# Patient Record
Sex: FEMALE | Race: WHITE | NOT HISPANIC OR LATINO | Employment: FULL TIME | URBAN - METROPOLITAN AREA
[De-identification: names, ages, dates, MRNs, and addresses within clinical notes are randomized per-mention and may not be internally consistent; named-entity substitution may affect disease eponyms.]

---

## 2023-06-01 ENCOUNTER — OFFICE VISIT (OUTPATIENT)
Dept: PRIMARY CARE | Facility: CLINIC | Age: 33
End: 2023-06-01
Payer: COMMERCIAL

## 2023-06-01 VITALS
SYSTOLIC BLOOD PRESSURE: 109 MMHG | HEART RATE: 91 BPM | WEIGHT: 163 LBS | OXYGEN SATURATION: 98 % | BODY MASS INDEX: 27.83 KG/M2 | DIASTOLIC BLOOD PRESSURE: 65 MMHG | HEIGHT: 64 IN

## 2023-06-01 DIAGNOSIS — M25.512 CHRONIC LEFT SHOULDER PAIN: ICD-10-CM

## 2023-06-01 DIAGNOSIS — G89.29 CHRONIC LEFT SHOULDER PAIN: ICD-10-CM

## 2023-06-01 DIAGNOSIS — K21.9 GASTROESOPHAGEAL REFLUX DISEASE, UNSPECIFIED WHETHER ESOPHAGITIS PRESENT: ICD-10-CM

## 2023-06-01 DIAGNOSIS — Z76.89 ENCOUNTER TO ESTABLISH CARE: Primary | ICD-10-CM

## 2023-06-01 PROCEDURE — 99204 OFFICE O/P NEW MOD 45 MIN: CPT | Performed by: STUDENT IN AN ORGANIZED HEALTH CARE EDUCATION/TRAINING PROGRAM

## 2023-06-01 PROCEDURE — 1036F TOBACCO NON-USER: CPT | Performed by: STUDENT IN AN ORGANIZED HEALTH CARE EDUCATION/TRAINING PROGRAM

## 2023-06-01 RX ORDER — FAMOTIDINE 20 MG/1
TABLET, FILM COATED ORAL
COMMUNITY
End: 2024-02-06 | Stop reason: WASHOUT

## 2023-06-01 NOTE — PROGRESS NOTES
"Subjective   Patient ID: Aileen Mercado is a 32 y.o. female who presents for New Patient Visit (Stomach burning and pain. Left shoulder pain. Left leg issues.).        HPI    eST CARE   Pt's PMH, PSH, SH, FH , meds and allergies was obtained / reviewed and updated .   ePIGASTRIC / luq PAIN since age 29   Burning in nature , sharp   Was suggested  to try PPI , burning subsided with PPI use   Also has retrosternal burn   Not currently taking PPI, but taking pepcid   Intentional weight loss     Fibromyalgia     Left shoulder/ ac joint pain   Possible hyperextension injury 5 years ago     Visit Vitals  /65   Pulse 91   Ht 1.62 m (5' 3.78\")   Wt 73.9 kg (163 lb)   LMP 05/02/2023   SpO2 98%   BMI 28.17 kg/m²   Smoking Status Former   BSA 1.82 m²      Patient's last menstrual period was 05/02/2023.     Review of Systems    Constitutional : No feeling poorly / fevers/ chills / night sweats/ fatigue   Cardiovascular : No CP /Palpitations/ lower extremity edema / syncope   Respiratory : No Cough /BAHENA/Dyspnea at rest   Gastrointestinal : No abd pain / N/V  No bloody stools/ melena / constipation  Endo : No polyuria/polydipsia/ muscle weakness / sluggishness   CNS: No confusion / HA/ tingling/ numbness/ weakness of extremities  Psychiatric: No anxiety/ depression/ SI/HI    All other systems have been reviewed and are negative for complaint       Physical Exam    Constitutional : Vitals reviewed. Alert and in no distress  Cardiovascular : RRR, Normal S1, S2, No pericardial rub/ gallop, no peripheral edema   Pulmonary: No respiratory distress, CTAB   MSK : Normal gait and station , strength and tone   Skin: Normal skin color and pigmentation, normal skin turgor and no rash   Neurologic : CNs 2-12 grossly intact , no obvious FNDs  Psych : A,Ox3, normal mood and affect      Assessment/Plan   Diagnoses and all orders for this visit:  Encounter to establish care  Gastroesophageal reflux disease, unspecified whether " esophagitis present  -     EGD; Future  Chronic left shoulder pain  -     Referral to Sports Medicine; Future  -     XR acromioclavicular joints bilateral; Future        # Chronic GERD   On pepcid   EGD ordered   H/o PPI use in the past   No alarming sx     # Left shoulder/ AC / muscular pain X 5 years , possible hyperextension injury 5 years ago   Xray and sports med     RTO as needed

## 2024-02-06 ENCOUNTER — OFFICE VISIT (OUTPATIENT)
Dept: PRIMARY CARE | Facility: CLINIC | Age: 34
End: 2024-02-06
Payer: COMMERCIAL

## 2024-02-06 VITALS
HEIGHT: 65 IN | OXYGEN SATURATION: 98 % | BODY MASS INDEX: 24.32 KG/M2 | DIASTOLIC BLOOD PRESSURE: 68 MMHG | WEIGHT: 146 LBS | SYSTOLIC BLOOD PRESSURE: 100 MMHG | HEART RATE: 91 BPM

## 2024-02-06 DIAGNOSIS — M19.039 OSTEOARTHRITIS OF FOREARM, UNSPECIFIED LATERALITY: ICD-10-CM

## 2024-02-06 DIAGNOSIS — M25.512 CHRONIC LEFT SHOULDER PAIN: ICD-10-CM

## 2024-02-06 DIAGNOSIS — G89.29 CHRONIC LEFT SHOULDER PAIN: ICD-10-CM

## 2024-02-06 DIAGNOSIS — E55.9 VITAMIN D DEFICIENCY: ICD-10-CM

## 2024-02-06 DIAGNOSIS — M79.7 FIBROMYALGIA: ICD-10-CM

## 2024-02-06 DIAGNOSIS — L03.119 CELLULITIS OF LOWER EXTREMITY, UNSPECIFIED LATERALITY: ICD-10-CM

## 2024-02-06 DIAGNOSIS — Z13.220 LIPID SCREENING: ICD-10-CM

## 2024-02-06 DIAGNOSIS — F41.8 ANXIETY WITH DEPRESSION: Primary | ICD-10-CM

## 2024-02-06 PROBLEM — K21.00 GASTROESOPHAGEAL REFLUX DISEASE WITH ESOPHAGITIS WITHOUT HEMORRHAGE: Status: ACTIVE | Noted: 2024-02-06

## 2024-02-06 PROCEDURE — 1036F TOBACCO NON-USER: CPT | Performed by: NURSE PRACTITIONER

## 2024-02-06 PROCEDURE — 99204 OFFICE O/P NEW MOD 45 MIN: CPT | Performed by: NURSE PRACTITIONER

## 2024-02-06 NOTE — PROGRESS NOTES
"Subjective   Patient ID: Aileen Mercado is a 33 y.o. female who presents for New Patient Visit (Patient is here today to establish care with a new pcp.).    New pt here to establish care.   Concerns: 2 months lump/bruise on LT thigh; LT shin redness edema, shiny skin- saw derm. Conflicting  info on cause.   Lots of joint pain- chronic. Someone mentioned EDS. LT shoulder chronic pain; LT ankle pain and edema; Bilat knee pain. LT wrist chronic pain-xray reviewed.   Chronic tenderness in the muscles.   No HA. No change in vision.   Worsening dry mouth-does not drink enough. Dry skin on the shins. No vaginal dryness/pain. 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 Was anti depressants- Zoloft caused shaking; Prozac- was OK. Cymbalta worked well for her. Asking for a RX for this.   Used to see psychiatry 8 years ago  Going through a divorce at this time. Anxiety is better.   Last saw PCP last year.   Work- [email or web address]- none; rare vape.   ETOH- occasional. 1 in the past month  Exercise- walk the dog  Diet- not eating well. Trying to eat better. Recent weight loss but assumes it is r/t stress/divorce.   Torn ACL in HS with repair.          Review of Systems    Objective   /68   Pulse 91   Ht 1.638 m (5' 4.5\")   Wt 66.2 kg (146 lb)   SpO2 98%   BMI 24.67 kg/m²     Physical Exam  Vitals and nursing note reviewed.   Constitutional:       General: She is not in acute distress.     Appearance: Normal appearance. She is normal weight.   HENT:      Head: Normocephalic and atraumatic.      Right Ear: External ear normal.      Left Ear: External ear normal.      Ears:      Comments: Bilateral ear canals with impacted cerumen.     Nose: Nose normal.      Mouth/Throat:      Mouth: Mucous membranes are moist.      Pharynx: Oropharynx is clear.   Eyes:      Extraocular Movements: Extraocular movements intact.      Conjunctiva/sclera: Conjunctivae normal.      Pupils: " Pupils are equal, round, and reactive to light.   Neck:      Vascular: No carotid bruit.      Comments: Thyroid is palpable bilateral lobes equally.  Rubbery.  No nodules appreciated.  No cervical lymph nodes palpated  Cardiovascular:      Rate and Rhythm: Normal rate and regular rhythm.      Pulses: Normal pulses.      Heart sounds: Normal heart sounds.   Pulmonary:      Effort: Pulmonary effort is normal.      Breath sounds: Normal breath sounds.   Musculoskeletal:         General: No swelling or deformity. Normal range of motion.      Cervical back: Normal range of motion and neck supple.      Comments: LT lateral thigh soft bulging mild discolored area  Bilateral lower shins with thickening of the skin, purple discoloration dryness, flaking.    Lymphadenopathy:      Cervical: No cervical adenopathy.   Skin:     General: Skin is warm and dry.      Capillary Refill: Capillary refill takes less than 2 seconds.      Comments: Bilateral lower extremities mid shin to ankle with very thickened skin taut shiny purple discoloration.  Left anterior shin with some flaking dry skin.  Numbness to the touch bilaterally.  +1-2 edema nonpitting   Neurological:      Mental Status: She is alert and oriented to person, place, and time.   Psychiatric:         Mood and Affect: Mood normal.         Behavior: Behavior normal.         Thought Content: Thought content normal.         Judgment: Judgment normal.         Assessment/Plan   Diagnoses and all orders for this visit:  Anxiety with depression  Comments:  Not currently treated  Orders:  -     Vitamin B12; Future  -     Comprehensive Metabolic Panel; Future  -     Thyroxine, Free; Future  -     Thyroid Stimulating Hormone; Future  Fibromyalgia  Comments:  Not treated.  Cymbalta could be an option but I will not start meds without blood work  Orders:  -     Vitamin D 25-Hydroxy,Total (for eval of Vitamin D levels); Future  -     Comprehensive Metabolic Panel; Future  -      Thyroxine, Free; Future  -     Thyroid Stimulating Hormone; Future  Osteoarthritis of forearm, unspecified laterality  Vitamin D deficiency  Comments:  Update vitamin D level  Orders:  -     Vitamin D 25-Hydroxy,Total (for eval of Vitamin D levels); Future  Chronic left shoulder pain  Cellulitis of lower extremity, unspecified laterality  Comments:  This looks like a chronic cellulitis bilateral lower extremities unknown cause.  Will check TSH free T4  Orders:  -     Comprehensive Metabolic Panel; Future  -     CBC and Auto Differential; Future  -     Thyroxine, Free; Future  -     Thyroid Stimulating Hormone; Future  Lipid screening  Comments:  Update fasting lipid panel  Orders:  -     Lipid Panel; Future  Other orders  -     Follow Up In Primary Care - Established; Future    She has a lot of chronic pain.  It seems to be rheumatologic issue.  Will do some blood work depending on what the results are can also do an autoimmune workup.  This was discussed with patient she is agreeable.

## 2024-02-08 ENCOUNTER — LAB (OUTPATIENT)
Dept: LAB | Facility: LAB | Age: 34
End: 2024-02-08
Payer: COMMERCIAL

## 2024-02-08 DIAGNOSIS — E55.9 VITAMIN D DEFICIENCY: ICD-10-CM

## 2024-02-08 DIAGNOSIS — M79.7 FIBROMYALGIA: ICD-10-CM

## 2024-02-08 DIAGNOSIS — F41.8 ANXIETY WITH DEPRESSION: ICD-10-CM

## 2024-02-08 DIAGNOSIS — Z13.220 LIPID SCREENING: ICD-10-CM

## 2024-02-08 DIAGNOSIS — L03.119 CELLULITIS OF LOWER EXTREMITY, UNSPECIFIED LATERALITY: ICD-10-CM

## 2024-02-08 LAB
ALBUMIN SERPL BCP-MCNC: 4 G/DL (ref 3.4–5)
ALP SERPL-CCNC: 41 U/L (ref 33–110)
ALT SERPL W P-5'-P-CCNC: 6 U/L (ref 7–45)
ANION GAP SERPL CALC-SCNC: 15 MMOL/L (ref 10–20)
AST SERPL W P-5'-P-CCNC: 12 U/L (ref 9–39)
BASOPHILS # BLD AUTO: 0.08 X10*3/UL (ref 0–0.1)
BASOPHILS NFR BLD AUTO: 1.1 %
BILIRUB SERPL-MCNC: 0.4 MG/DL (ref 0–1.2)
BUN SERPL-MCNC: 11 MG/DL (ref 6–23)
CALCIUM SERPL-MCNC: 8.7 MG/DL (ref 8.6–10.3)
CHLORIDE SERPL-SCNC: 104 MMOL/L (ref 98–107)
CHOLEST SERPL-MCNC: 111 MG/DL (ref 0–199)
CHOLESTEROL/HDL RATIO: 2.3
CO2 SERPL-SCNC: 26 MMOL/L (ref 21–32)
CREAT SERPL-MCNC: 0.65 MG/DL (ref 0.5–1.05)
EGFRCR SERPLBLD CKD-EPI 2021: >90 ML/MIN/1.73M*2
EOSINOPHIL # BLD AUTO: 0.2 X10*3/UL (ref 0–0.7)
EOSINOPHIL NFR BLD AUTO: 2.7 %
ERYTHROCYTE [DISTWIDTH] IN BLOOD BY AUTOMATED COUNT: 13.5 % (ref 11.5–14.5)
GLUCOSE SERPL-MCNC: 77 MG/DL (ref 74–99)
HCT VFR BLD AUTO: 38.2 % (ref 36–46)
HDLC SERPL-MCNC: 49 MG/DL
HGB BLD-MCNC: 12.4 G/DL (ref 12–16)
IMM GRANULOCYTES # BLD AUTO: 0.01 X10*3/UL (ref 0–0.7)
IMM GRANULOCYTES NFR BLD AUTO: 0.1 % (ref 0–0.9)
LDLC SERPL CALC-MCNC: 52 MG/DL
LYMPHOCYTES # BLD AUTO: 2.15 X10*3/UL (ref 1.2–4.8)
LYMPHOCYTES NFR BLD AUTO: 28.5 %
MCH RBC QN AUTO: 30.7 PG (ref 26–34)
MCHC RBC AUTO-ENTMCNC: 32.5 G/DL (ref 32–36)
MCV RBC AUTO: 95 FL (ref 80–100)
MONOCYTES # BLD AUTO: 0.47 X10*3/UL (ref 0.1–1)
MONOCYTES NFR BLD AUTO: 6.2 %
NEUTROPHILS # BLD AUTO: 4.63 X10*3/UL (ref 1.2–7.7)
NEUTROPHILS NFR BLD AUTO: 61.4 %
NON HDL CHOLESTEROL: 62 MG/DL (ref 0–149)
NRBC BLD-RTO: 0 /100 WBCS (ref 0–0)
PLATELET # BLD AUTO: 269 X10*3/UL (ref 150–450)
POTASSIUM SERPL-SCNC: 4.5 MMOL/L (ref 3.5–5.3)
PROT SERPL-MCNC: 7.1 G/DL (ref 6.4–8.2)
RBC # BLD AUTO: 4.04 X10*6/UL (ref 4–5.2)
SODIUM SERPL-SCNC: 140 MMOL/L (ref 136–145)
T4 FREE SERPL-MCNC: 0.59 NG/DL (ref 0.61–1.12)
TRIGL SERPL-MCNC: 49 MG/DL (ref 0–149)
TSH SERPL-ACNC: 1.71 MIU/L (ref 0.44–3.98)
VLDL: 10 MG/DL (ref 0–40)
WBC # BLD AUTO: 7.5 X10*3/UL (ref 4.4–11.3)

## 2024-02-08 PROCEDURE — 82306 VITAMIN D 25 HYDROXY: CPT

## 2024-02-08 PROCEDURE — 85025 COMPLETE CBC W/AUTO DIFF WBC: CPT

## 2024-02-08 PROCEDURE — 80053 COMPREHEN METABOLIC PANEL: CPT

## 2024-02-08 PROCEDURE — 84439 ASSAY OF FREE THYROXINE: CPT

## 2024-02-08 PROCEDURE — 84443 ASSAY THYROID STIM HORMONE: CPT

## 2024-02-08 PROCEDURE — 80061 LIPID PANEL: CPT

## 2024-02-08 PROCEDURE — 36415 COLL VENOUS BLD VENIPUNCTURE: CPT

## 2024-02-08 PROCEDURE — 82607 VITAMIN B-12: CPT

## 2024-02-09 LAB
25(OH)D3 SERPL-MCNC: 15 NG/ML (ref 30–100)
VIT B12 SERPL-MCNC: 230 PG/ML (ref 211–911)

## 2024-02-15 ENCOUNTER — PATIENT MESSAGE (OUTPATIENT)
Dept: PRIMARY CARE | Facility: CLINIC | Age: 34
End: 2024-02-15

## 2024-02-15 ENCOUNTER — OFFICE VISIT (OUTPATIENT)
Dept: PRIMARY CARE | Facility: CLINIC | Age: 34
End: 2024-02-15
Payer: COMMERCIAL

## 2024-02-15 VITALS
HEART RATE: 91 BPM | WEIGHT: 147 LBS | BODY MASS INDEX: 24.49 KG/M2 | OXYGEN SATURATION: 99 % | DIASTOLIC BLOOD PRESSURE: 79 MMHG | HEIGHT: 65 IN | SYSTOLIC BLOOD PRESSURE: 127 MMHG

## 2024-02-15 DIAGNOSIS — F41.8 ANXIETY WITH DEPRESSION: Primary | ICD-10-CM

## 2024-02-15 DIAGNOSIS — M25.532 LEFT WRIST PAIN: ICD-10-CM

## 2024-02-15 DIAGNOSIS — E53.8 VITAMIN B 12 DEFICIENCY: ICD-10-CM

## 2024-02-15 DIAGNOSIS — E55.9 VITAMIN D DEFICIENCY: ICD-10-CM

## 2024-02-15 PROCEDURE — 99214 OFFICE O/P EST MOD 30 MIN: CPT | Performed by: NURSE PRACTITIONER

## 2024-02-15 PROCEDURE — 1036F TOBACCO NON-USER: CPT | Performed by: NURSE PRACTITIONER

## 2024-02-15 NOTE — PATIENT INSTRUCTIONS
Vitamin D def- add Vitamin d 3 5,000 units a day with food  B12 def- add 1000mcg a day sublingual every am.   Vicks on the toenails.   Repeat the TSH, FT4, FT3 in 6 months  Pepcid 20 mg for reflux  Omeprazole 20 mg pm for reflux

## 2024-02-15 NOTE — PROGRESS NOTES
"Subjective   Patient ID: Aileen Mercado is a 33 y.o. female who presents for Follow-up and Results (Patient is here today to review her lab results. ).    33 year old female here to review recent lab results. LP, CBC, CMP looks good. TSH looks good, FT4 low. No known Thyroid disease.   Vitamin and B 12 deficiency   A few more concerns- toenail discoloration  Chronic left wrist pain- asking for ortho referral.   \"Bumps under the skin\".   Chronic stomach indigestion. Was supposed to have an EGD but could not afford it.          Review of Systems    Objective   /79   Pulse 91   Ht 1.638 m (5' 4.5\")   Wt 66.7 kg (147 lb)   SpO2 99%   BMI 24.84 kg/m²     Physical Exam  Constitutional:       Appearance: Normal appearance.   Cardiovascular:      Rate and Rhythm: Normal rate and regular rhythm.   Pulmonary:      Effort: Pulmonary effort is normal.      Breath sounds: Normal breath sounds.   Skin:     Comments: All toenails are purple in color. WTT. No injuries. Surrounding nail beds and skin pink   Neurological:      Mental Status: She is alert and oriented to person, place, and time.   Psychiatric:         Mood and Affect: Mood normal.         Assessment/Plan   Diagnoses and all orders for this visit:  Left wrist pain  Comments:  Ortho referral  Orders:  -     Referral to Orthopaedic Surgery; Future  Vitamin D deficiency  Comments:  Add vitamin D3 5000 units daily with food  Vitamin B 12 deficiency  Comments:  Add vitamin B12 daily sublingual in the a.m.  Other orders  -     Follow Up In Primary Care - Established  -     Follow Up In Primary Care - Established; Future    I cannot explain why her toenails are discolored.  She can try over-the-counter Vicks twice a day.  She can see dermatology.  There is no other symptoms accompanying the discoloration of the nails.  At this time will monitor     "

## 2024-02-20 RX ORDER — DULOXETIN HYDROCHLORIDE 30 MG/1
30 CAPSULE, DELAYED RELEASE ORAL DAILY
Qty: 30 CAPSULE | Refills: 5 | Status: SHIPPED | OUTPATIENT
Start: 2024-02-20 | End: 2024-08-18

## 2024-03-21 ENCOUNTER — APPOINTMENT (OUTPATIENT)
Dept: OBSTETRICS AND GYNECOLOGY | Facility: CLINIC | Age: 34
End: 2024-03-21
Payer: COMMERCIAL

## 2024-03-21 ENCOUNTER — APPOINTMENT (OUTPATIENT)
Dept: ORTHOPEDIC SURGERY | Facility: CLINIC | Age: 34
End: 2024-03-21
Payer: COMMERCIAL

## 2024-04-11 ENCOUNTER — APPOINTMENT (OUTPATIENT)
Dept: ORTHOPEDIC SURGERY | Facility: CLINIC | Age: 34
End: 2024-04-11
Payer: COMMERCIAL

## 2024-04-11 ENCOUNTER — APPOINTMENT (OUTPATIENT)
Dept: OBSTETRICS AND GYNECOLOGY | Facility: CLINIC | Age: 34
End: 2024-04-11
Payer: COMMERCIAL

## 2024-05-02 ENCOUNTER — APPOINTMENT (OUTPATIENT)
Dept: ORTHOPEDIC SURGERY | Facility: CLINIC | Age: 34
End: 2024-05-02
Payer: COMMERCIAL

## 2024-05-02 ENCOUNTER — APPOINTMENT (OUTPATIENT)
Dept: OBSTETRICS AND GYNECOLOGY | Facility: CLINIC | Age: 34
End: 2024-05-02
Payer: COMMERCIAL

## 2024-05-09 ENCOUNTER — APPOINTMENT (OUTPATIENT)
Dept: ORTHOPEDIC SURGERY | Facility: CLINIC | Age: 34
End: 2024-05-09
Payer: COMMERCIAL

## 2024-06-13 ENCOUNTER — OFFICE VISIT (OUTPATIENT)
Dept: ORTHOPEDIC SURGERY | Facility: CLINIC | Age: 34
End: 2024-06-13
Payer: COMMERCIAL

## 2024-06-13 ENCOUNTER — HOSPITAL ENCOUNTER (OUTPATIENT)
Dept: RADIOLOGY | Facility: CLINIC | Age: 34
Discharge: HOME | End: 2024-06-13
Payer: COMMERCIAL

## 2024-06-13 DIAGNOSIS — M25.532 LEFT WRIST PAIN: ICD-10-CM

## 2024-06-13 DIAGNOSIS — M19.032 ARTHRITIS OF LEFT WRIST: ICD-10-CM

## 2024-06-13 DIAGNOSIS — M25.532 LEFT WRIST PAIN: Primary | ICD-10-CM

## 2024-06-13 PROCEDURE — 73110 X-RAY EXAM OF WRIST: CPT | Mod: LT

## 2024-06-13 PROCEDURE — 1036F TOBACCO NON-USER: CPT | Performed by: ORTHOPAEDIC SURGERY

## 2024-06-13 PROCEDURE — 99214 OFFICE O/P EST MOD 30 MIN: CPT | Performed by: ORTHOPAEDIC SURGERY

## 2024-06-13 PROCEDURE — 73110 X-RAY EXAM OF WRIST: CPT | Mod: LEFT SIDE | Performed by: STUDENT IN AN ORGANIZED HEALTH CARE EDUCATION/TRAINING PROGRAM

## 2024-06-13 PROCEDURE — 99204 OFFICE O/P NEW MOD 45 MIN: CPT | Performed by: ORTHOPAEDIC SURGERY

## 2024-06-13 ASSESSMENT — PAIN - FUNCTIONAL ASSESSMENT: PAIN_FUNCTIONAL_ASSESSMENT: 0-10

## 2024-06-13 ASSESSMENT — ENCOUNTER SYMPTOMS
LOSS OF SENSATION IN FEET: 0
DEPRESSION: 0
OCCASIONAL FEELINGS OF UNSTEADINESS: 0

## 2024-06-13 ASSESSMENT — PATIENT HEALTH QUESTIONNAIRE - PHQ9
1. LITTLE INTEREST OR PLEASURE IN DOING THINGS: NOT AT ALL
2. FEELING DOWN, DEPRESSED OR HOPELESS: NOT AT ALL
SUM OF ALL RESPONSES TO PHQ9 QUESTIONS 1 AND 2: 0

## 2024-06-13 ASSESSMENT — PAIN SCALES - GENERAL: PAINLEVEL_OUTOF10: 6

## 2024-06-15 PROCEDURE — 20605 DRAIN/INJ JOINT/BURSA W/O US: CPT | Performed by: ORTHOPAEDIC SURGERY

## 2024-06-15 RX ORDER — LIDOCAINE HYDROCHLORIDE 10 MG/ML
1 INJECTION INFILTRATION; PERINEURAL
Status: COMPLETED | OUTPATIENT
Start: 2024-06-15 | End: 2024-06-15

## 2024-06-15 RX ORDER — TRIAMCINOLONE ACETONIDE 40 MG/ML
40 INJECTION, SUSPENSION INTRA-ARTICULAR; INTRAMUSCULAR
Status: COMPLETED | OUTPATIENT
Start: 2024-06-15 | End: 2024-06-15

## 2024-06-15 NOTE — PROGRESS NOTES
History of Present Illness:  Chief Complaint   Patient presents with    Left Wrist - Pain       33-year-old female presents for evaluation of chronic left wrist pain.  She has had pain into her left wrist for years.  This is worse with increased activities including lifting/loading her wrist.  This is particularly true when wrist is in extremes of flexion/extension.  She has noticed some decreased motion in her wrist as well.  She does have history of inflammatory arthritis, but not currently under the care of of a rheumatologist.  Previous issue with sulfadiazine that was used to treat her inflammatory arthritis.  She has had progressive weakness secondary to her pain.    Past Medical History:   Diagnosis Date    Anemia     Arthritis     Fibromyalgia     MGUS (monoclonal gammopathy of unknown significance)     Sepsis (Multi)        Medication Documentation Review Audit       Reviewed by Nena Cohen CMA (Medical Assistant) on 24 at 1037      Medication Order Taking? Sig Documenting Provider Last Dose Status   DULoxetine (Cymbalta) 30 mg DR capsule [id number]  Take 1 capsule (30 mg) by mouth once daily. Do not crush or chew. Jillian M Butt, APRN-CNP  Active                    Allergies   Allergen Reactions    Sulfadiazine Anaphylaxis    Cephalosporins Unknown    Penicillins Hives and Rash       Social History     Socioeconomic History    Marital status: Single     Spouse name: Not on file    Number of children: Not on file    Years of education: Not on file    Highest education level: Not on file   Occupational History    Not on file   Tobacco Use    Smoking status: Former     Current packs/day: 0.00     Types: Cigarettes     Quit date:      Years since quittin.4    Smokeless tobacco: Never   Substance and Sexual Activity    Alcohol use: Not Currently    Drug use: Not Currently    Sexual activity: Not on file   Other Topics Concern    Not on file   Social History Narrative    Not on file     Social  Determinants of Health     Financial Resource Strain: Low Risk  (7/12/2020)    Received from [facility]    Overall Financial Resource Strain (CARDIA)     Difficulty of Paying Living Expenses: Not very hard   Food Insecurity: No Food Insecurity (7/12/2020)    Received from [facility]    Hunger Vital Sign     Worried About Running Out of Food in the Last Year: Never true     Ran Out of Food in the Last Year: Never true   Transportation Needs: No Transportation Needs (7/12/2020)    Received from [facility]    PRAPARE - Transportation     Lack of Transportation (Medical): No     Lack of Transportation (Non-Medical): No   Physical Activity: Sufficiently Active (7/12/2020)    Received from [facility]    Exercise Vital Sign     Days of Exercise per Week: 3 days     Minutes of Exercise per Session: 60 min   Stress: Stress Concern Present (7/12/2020)    Received from [facility]    Citizen of the [place] [employer] - Occupational Stress Questionnaire     Feeling of Stress : Rather much   Social Connections: Socially Isolated (7/12/2020)    Received from [facility]    Social Connection and Isolation Panel [NHANES]     Frequency of Communication with Friends and Family: Once a week     Frequency of Social Gatherings with Friends and Family: Once a week     Attends Jainism Services: Never     Active Member of Clubs or Organizations: No     Attends Club or Organization Meetings: Never     Marital Status:    Intimate Partner Violence: Not on file   Housing Stability: Unknown (7/12/2020)    Received from [facility]    Housing Stability Vital Sign     Unable to Pay for Housing in the Last Year: No     Number of Places Lived in the Last Year: Not on file     Unstable Housing in the Last Year: No       Past Surgical History:   Procedure Laterality Date    ANTERIOR CRUCIATE LIGAMENT REPAIR          Review of Systems   GENERAL: Negative for malaise, significant weight loss,  fever  MUSCULOSKELETAL: see HPI  NEURO:  Negative     Physical Examination  Constitutional: Appears well-developed and well-nourished.  Head: Normocephalic and atraumatic.  Eyes: EOMI grossly  Cardiovascular: Intact distal pulses.   Respiratory: Effort normal. No respiratory distress.  Neurologic: Alert and oriented to person, place, and time.  Skin: Skin is warm and dry.  Hematologic / Lymphatic: No lymphedema, lymphangitis.  Psychiatric: normal mood and affect. Behavior is normal.   Musculoskeletal:  Left wrist: Skin intact.  No notable edema.  70 degrees extension and 45 degrees flexion.  Positive EDUARDO.  No tenderness overlying distal pole of scaphoid/anatomic snuffbox.  Negative DRUJ/TFCC.  80/80 degrees pronation/supination.  Sensation grossly intact distally.  2+ radial pulse.    Radiographs: Left wrist radiographs ordered and available for my review/interpretation demonstrate degenerative changes in the radiocarpal and midcarpal joints.  Lunate sitting in significant flexion.  Findings consistent with inflammatory arthritis.  No fracture/dislocation.     Assessment:  Patient with chronic left wrist pain that is increasing in severity.  Radiographs consistent with inflammatory arthritis.     Plan:  We discussed likely diagnosis as well as potential treatment options.  We discussed role of bracing, activity modifications, anti-inflammatories, potential corticosteroid injections as well as medical management under the care of of rheumatologist.  Given her current level of symptoms she would like to proceed with corticosteroid injection.  I have also placed a referral for rheumatology as this may be of the most benefit long-term.    Patient ID: Aileen Mercado is a 33 y.o. female.    M Inj/Asp: L radiocarpal on 6/15/2024 5:25 PM  Indications: pain  Details: 25 G needle, dorsal approach  Medications: 40 mg triamcinolone acetonide 40 mg/mL; 1 mL lidocaine 10 mg/mL (1 %)  Outcome: tolerated well, no immediate  complications  Procedure, treatment alternatives, risks and benefits explained, specific risks discussed. Consent was given by the patient. Immediately prior to procedure a time out was called to verify the correct patient, procedure, equipment, support staff and site/side marked as required. Patient was prepped and draped in the usual sterile fashion.

## 2024-06-26 ENCOUNTER — APPOINTMENT (OUTPATIENT)
Dept: OBSTETRICS AND GYNECOLOGY | Facility: CLINIC | Age: 34
End: 2024-06-26
Payer: COMMERCIAL

## 2024-06-26 VITALS
WEIGHT: 147 LBS | HEIGHT: 65 IN | SYSTOLIC BLOOD PRESSURE: 122 MMHG | BODY MASS INDEX: 24.49 KG/M2 | DIASTOLIC BLOOD PRESSURE: 76 MMHG

## 2024-06-26 DIAGNOSIS — Z12.4 PAP SMEAR FOR CERVICAL CANCER SCREENING: ICD-10-CM

## 2024-06-26 DIAGNOSIS — Z11.3 SCREENING EXAMINATION FOR STI: ICD-10-CM

## 2024-06-26 DIAGNOSIS — Z01.419 WELL WOMAN EXAM: Primary | ICD-10-CM

## 2024-06-26 PROCEDURE — 87591 N.GONORRHOEAE DNA AMP PROB: CPT

## 2024-06-26 PROCEDURE — 99385 PREV VISIT NEW AGE 18-39: CPT | Performed by: MIDWIFE

## 2024-06-26 PROCEDURE — 87491 CHLMYD TRACH DNA AMP PROBE: CPT

## 2024-06-26 PROCEDURE — 87624 HPV HI-RISK TYP POOLED RSLT: CPT

## 2024-06-26 PROCEDURE — 1036F TOBACCO NON-USER: CPT | Performed by: MIDWIFE

## 2024-06-26 PROCEDURE — 88142 CYTOPATH C/V THIN LAYER: CPT

## 2024-06-26 NOTE — PROGRESS NOTES
Subjective   Patient ID: Aileen Mercado is a 33 y.o. female who presents for annual.  HPI  PMHx: 2017 ASCUS +HPV pap; pap 2019 NIL  SocHx:  in 4/2024, no current partner  ROS: no pelvic pain, no urinary c/o, NAD  Review of Systems    Objective   Physical Exam  Vitals and nursing note reviewed.   Constitutional:       Appearance: Normal appearance.   HENT:      Nose: Nose normal.   Eyes:      Pupils: Pupils are equal, round, and reactive to light.   Neck:      Thyroid: No thyroid mass.   Cardiovascular:      Rate and Rhythm: Normal rate and regular rhythm.   Pulmonary:      Effort: Pulmonary effort is normal.      Breath sounds: Normal breath sounds.   Chest:      Chest wall: No mass.   Breasts:     Right: Normal.      Left: Normal.   Abdominal:      Palpations: Abdomen is soft. There is no mass.      Tenderness: There is no abdominal tenderness.   Genitourinary:     General: Normal vulva.      Exam position: Lithotomy position.      Labia:         Right: No rash, tenderness or lesion.         Left: No rash, tenderness or lesion.       Vagina: Bleeding present.      Cervix: Normal.      Uterus: Normal.       Adnexa: Right adnexa normal and left adnexa normal.      Comments: Pap obtained  On menses  Musculoskeletal:         General: Normal range of motion.   Lymphadenopathy:      Cervical: No cervical adenopathy.      Lower Body: No right inguinal adenopathy. No left inguinal adenopathy.   Skin:     General: Skin is warm and dry.   Neurological:      Mental Status: She is alert and oriented to person, place, and time.      Motor: Motor function is intact.      Gait: Gait is intact.   Psychiatric:         Mood and Affect: Mood normal.         Assessment/Plan   Diagnoses and all orders for this visit:  Well woman exam  -     C. Trachomatis / N. Gonorrhoeae, Amplified Detection  -     THINPREP PAP TEST  Screening examination for STI  -     C. Trachomatis / N. Gonorrhoeae, Amplified Detection  Pap smear for  cervical cancer screening  -     THINPREP PAP TEST    Reassurance given re exam findings  Pt declined BCM at this time  RTO AE/PRN       SUSY Agudelo-KELLEN, ND 06/26/24 11:02 AM

## 2024-06-27 LAB
C TRACH RRNA SPEC QL NAA+PROBE: NEGATIVE
N GONORRHOEA DNA SPEC QL PROBE+SIG AMP: NEGATIVE

## 2024-07-03 ENCOUNTER — HOSPITAL ENCOUNTER (OUTPATIENT)
Dept: RADIOLOGY | Facility: CLINIC | Age: 34
Discharge: HOME | End: 2024-07-03
Payer: COMMERCIAL

## 2024-07-03 ENCOUNTER — APPOINTMENT (OUTPATIENT)
Dept: PRIMARY CARE | Facility: CLINIC | Age: 34
End: 2024-07-03
Payer: COMMERCIAL

## 2024-07-03 VITALS
OXYGEN SATURATION: 96 % | SYSTOLIC BLOOD PRESSURE: 116 MMHG | HEIGHT: 65 IN | BODY MASS INDEX: 24.49 KG/M2 | HEART RATE: 97 BPM | WEIGHT: 147 LBS | DIASTOLIC BLOOD PRESSURE: 80 MMHG

## 2024-07-03 DIAGNOSIS — R22.31 MASS OF RIGHT FOREARM: ICD-10-CM

## 2024-07-03 DIAGNOSIS — R22.31 MASS OF RIGHT FOREARM: Primary | ICD-10-CM

## 2024-07-03 PROBLEM — F33.8 SEASONAL DEPRESSION (CMS-HCC): Status: ACTIVE | Noted: 2024-07-03

## 2024-07-03 PROBLEM — D47.2 MONOCLONAL GAMMOPATHY OF UNKNOWN SIGNIFICANCE (MGUS): Status: ACTIVE | Noted: 2024-07-03

## 2024-07-03 PROCEDURE — 1036F TOBACCO NON-USER: CPT | Performed by: NURSE PRACTITIONER

## 2024-07-03 PROCEDURE — 73090 X-RAY EXAM OF FOREARM: CPT | Mod: RIGHT SIDE | Performed by: RADIOLOGY

## 2024-07-03 PROCEDURE — 73090 X-RAY EXAM OF FOREARM: CPT | Mod: RT

## 2024-07-03 PROCEDURE — 99213 OFFICE O/P EST LOW 20 MIN: CPT | Performed by: NURSE PRACTITIONER

## 2024-07-03 RX ORDER — VIT C/E/ZN/COPPR/LUTEIN/ZEAXAN 250MG-90MG
1000 CAPSULE ORAL
COMMUNITY

## 2024-07-03 RX ORDER — LANOLIN ALCOHOL/MO/W.PET/CERES
1000 CREAM (GRAM) TOPICAL
COMMUNITY

## 2024-07-03 NOTE — PROGRESS NOTES
"Subjective   Patient ID: Aileen Mercado is a 34 y.o. female who presents for Cyst (Patient mentioned for the last 3 months she noticed a lump on her right forearm. Patient states she did not do anything to cause it and it is not painful. Patient noticed it is getting bigger in size. ).    34 year old female here for \"an acute issue\".   a couple months ago she noticed a mass on the posterior RT forearm. Non tender, thinks it is getting larger. She also has a very small one on her LT calf that is firm. Another one on the LT shin bone  CBC from early this year reviewed.          Review of Systems    Objective   /80   Pulse 97   Ht 1.638 m (5' 4.5\")   Wt 66.7 kg (147 lb)   LMP 06/21/2024   SpO2 96%   BMI 24.84 kg/m²     Physical Exam  Constitutional:       Appearance: Normal appearance. She is obese.   Musculoskeletal:        Arms:       Comments: Golf ball sized nodular mass deep in the posterior FA. It feels like it is right next to or on the ulna. Non tender to palpation. It is mobile, round, soft   Neurological:      Mental Status: She is alert.         Assessment/Plan   Diagnoses and all orders for this visit:  Mass of right forearm  Comments:  poss cyst. get Xray of ulna  Orders:  -     XR forearm right 2 views; Future         "

## 2024-07-08 LAB
CYTOLOGY CMNT CVX/VAG CYTO-IMP: NORMAL
HPV HR 12 DNA GENITAL QL NAA+PROBE: NEGATIVE
HPV HR GENOTYPES PNL CVX NAA+PROBE: NEGATIVE
HPV16 DNA SPEC QL NAA+PROBE: NEGATIVE
HPV18 DNA SPEC QL NAA+PROBE: NEGATIVE
LAB AP HPV GENOTYPE QUESTION: YES
LAB AP HPV HR: NORMAL
LABORATORY COMMENT REPORT: NORMAL
LABORATORY COMMENT REPORT: NORMAL
LMP START DATE: NORMAL
PATH REPORT.TOTAL CANCER: NORMAL
RESIDENT REVIEW: NORMAL

## 2024-07-10 ENCOUNTER — OFFICE VISIT (OUTPATIENT)
Dept: ORTHOPEDIC SURGERY | Facility: CLINIC | Age: 34
End: 2024-07-10
Payer: COMMERCIAL

## 2024-07-10 ENCOUNTER — LAB (OUTPATIENT)
Dept: LAB | Facility: LAB | Age: 34
End: 2024-07-10
Payer: COMMERCIAL

## 2024-07-10 ENCOUNTER — HOSPITAL ENCOUNTER (OUTPATIENT)
Dept: RADIOLOGY | Facility: CLINIC | Age: 34
Discharge: HOME | End: 2024-07-10
Payer: COMMERCIAL

## 2024-07-10 VITALS — BODY MASS INDEX: 25.1 KG/M2 | HEIGHT: 64 IN | WEIGHT: 147 LBS

## 2024-07-10 DIAGNOSIS — R22.41 LEG MASS, RIGHT: ICD-10-CM

## 2024-07-10 DIAGNOSIS — R22.41 LEG MASS, RIGHT: Primary | ICD-10-CM

## 2024-07-10 LAB
ALBUMIN SERPL BCP-MCNC: 4 G/DL (ref 3.4–5)
ALP SERPL-CCNC: 48 U/L (ref 33–110)
ALT SERPL W P-5'-P-CCNC: 6 U/L (ref 7–45)
ANION GAP SERPL CALC-SCNC: 11 MMOL/L (ref 10–20)
AST SERPL W P-5'-P-CCNC: 14 U/L (ref 9–39)
BASOPHILS # BLD AUTO: 0.08 X10*3/UL (ref 0–0.1)
BASOPHILS NFR BLD AUTO: 1 %
BILIRUB SERPL-MCNC: 0.2 MG/DL (ref 0–1.2)
BUN SERPL-MCNC: 10 MG/DL (ref 6–23)
CALCIUM SERPL-MCNC: 8.6 MG/DL (ref 8.6–10.3)
CHLORIDE SERPL-SCNC: 103 MMOL/L (ref 98–107)
CO2 SERPL-SCNC: 29 MMOL/L (ref 21–32)
CREAT SERPL-MCNC: 0.62 MG/DL (ref 0.5–1.05)
CRP SERPL-MCNC: 0.96 MG/DL
EGFRCR SERPLBLD CKD-EPI 2021: >90 ML/MIN/1.73M*2
EOSINOPHIL # BLD AUTO: 0.17 X10*3/UL (ref 0–0.7)
EOSINOPHIL NFR BLD AUTO: 2.2 %
ERYTHROCYTE [DISTWIDTH] IN BLOOD BY AUTOMATED COUNT: 13 % (ref 11.5–14.5)
ERYTHROCYTE [SEDIMENTATION RATE] IN BLOOD BY WESTERGREN METHOD: 24 MM/H (ref 0–20)
GLUCOSE SERPL-MCNC: 90 MG/DL (ref 74–99)
HCT VFR BLD AUTO: 35.3 % (ref 36–46)
HGB BLD-MCNC: 11.8 G/DL (ref 12–16)
IMM GRANULOCYTES # BLD AUTO: 0.02 X10*3/UL (ref 0–0.7)
IMM GRANULOCYTES NFR BLD AUTO: 0.3 % (ref 0–0.9)
LYMPHOCYTES # BLD AUTO: 2.11 X10*3/UL (ref 1.2–4.8)
LYMPHOCYTES NFR BLD AUTO: 27.6 %
MCH RBC QN AUTO: 31.1 PG (ref 26–34)
MCHC RBC AUTO-ENTMCNC: 33.4 G/DL (ref 32–36)
MCV RBC AUTO: 93 FL (ref 80–100)
MONOCYTES # BLD AUTO: 0.45 X10*3/UL (ref 0.1–1)
MONOCYTES NFR BLD AUTO: 5.9 %
NEUTROPHILS # BLD AUTO: 4.82 X10*3/UL (ref 1.2–7.7)
NEUTROPHILS NFR BLD AUTO: 63 %
NRBC BLD-RTO: 0 /100 WBCS (ref 0–0)
PLATELET # BLD AUTO: 361 X10*3/UL (ref 150–450)
POTASSIUM SERPL-SCNC: 4.7 MMOL/L (ref 3.5–5.3)
PROT SERPL-MCNC: 7.3 G/DL (ref 6.4–8.2)
RBC # BLD AUTO: 3.79 X10*6/UL (ref 4–5.2)
RHEUMATOID FACT SER NEPH-ACNC: <10 IU/ML (ref 0–15)
SODIUM SERPL-SCNC: 138 MMOL/L (ref 136–145)
WBC # BLD AUTO: 7.7 X10*3/UL (ref 4.4–11.3)

## 2024-07-10 PROCEDURE — 85025 COMPLETE CBC W/AUTO DIFF WBC: CPT

## 2024-07-10 PROCEDURE — 99214 OFFICE O/P EST MOD 30 MIN: CPT | Performed by: STUDENT IN AN ORGANIZED HEALTH CARE EDUCATION/TRAINING PROGRAM

## 2024-07-10 PROCEDURE — 80053 COMPREHEN METABOLIC PANEL: CPT

## 2024-07-10 PROCEDURE — 85652 RBC SED RATE AUTOMATED: CPT

## 2024-07-10 PROCEDURE — 85613 RUSSELL VIPER VENOM DILUTED: CPT

## 2024-07-10 PROCEDURE — 81381 HLA I TYPING 1 ALLELE HR: CPT

## 2024-07-10 PROCEDURE — 73590 X-RAY EXAM OF LOWER LEG: CPT | Mod: RT

## 2024-07-10 PROCEDURE — 36415 COLL VENOUS BLD VENIPUNCTURE: CPT

## 2024-07-10 PROCEDURE — 1036F TOBACCO NON-USER: CPT | Performed by: STUDENT IN AN ORGANIZED HEALTH CARE EDUCATION/TRAINING PROGRAM

## 2024-07-10 PROCEDURE — 86431 RHEUMATOID FACTOR QUANT: CPT

## 2024-07-10 PROCEDURE — 99204 OFFICE O/P NEW MOD 45 MIN: CPT | Performed by: STUDENT IN AN ORGANIZED HEALTH CARE EDUCATION/TRAINING PROGRAM

## 2024-07-10 PROCEDURE — 86140 C-REACTIVE PROTEIN: CPT

## 2024-07-10 PROCEDURE — 85730 THROMBOPLASTIN TIME PARTIAL: CPT

## 2024-07-10 PROCEDURE — 73590 X-RAY EXAM OF LOWER LEG: CPT | Mod: RIGHT SIDE | Performed by: RADIOLOGY

## 2024-07-10 PROCEDURE — 86200 CCP ANTIBODY: CPT

## 2024-07-10 ASSESSMENT — PAIN SCALES - GENERAL: PAINLEVEL_OUTOF10: 5 - MODERATE PAIN

## 2024-07-10 ASSESSMENT — ENCOUNTER SYMPTOMS
LOSS OF SENSATION IN FEET: 0
OCCASIONAL FEELINGS OF UNSTEADINESS: 0
DEPRESSION: 0

## 2024-07-10 ASSESSMENT — PAIN - FUNCTIONAL ASSESSMENT: PAIN_FUNCTIONAL_ASSESSMENT: 0-10

## 2024-07-10 NOTE — PROGRESS NOTES
Orthopaedic Surgery  New Patient Clinic Note    Aileen Mercado [id number] July 10, 2024    Reason for Consult: Right forearm as well as right leg mass    HPI: 34-year-old female with multiple previous inflammatory medical comorbidities.  She states she was diagnosed with inflammatory arthropathy once when she was younger but had complications with the medication and therefore ever went back to see rheumatology.  She experienced a growing mass on the dorsal aspect of her right forearm near the ulnar subcutaneous border which has been slowly growing over the last few month.  She is also noticed multiple small masses growing over the anterior aspect of her leg.  She has a baseline history of which she was told is eczema on her bilateral lower extremities as well.  She also suffers from fibromyalgia.  She was referred to us to discuss options as well as what should be done regarding these right forearm as well as right leg masses.    ROS:  15 point review of systems collected per intake sheet and negative except for as noted in HPI.    PMH:   Past Medical History:   Diagnosis Date    Anemia     Arthritis     Fibromyalgia     MGUS (monoclonal gammopathy of unknown significance)     Sepsis (Multi)     No history of DVT.     PSH:    Past Surgical History:   Procedure Laterality Date    ANTERIOR CRUCIATE LIGAMENT REPAIR          SHx: No smoking. No IVDU    Meds:   Current Outpatient Medications on File Prior to Visit   Medication Sig Dispense Refill    cholecalciferol (Vitamin D-3) 25 MCG (1000 UT) capsule Take 1 capsule (25 mcg) by mouth once daily.      cyanocobalamin (Vitamin B-12) 1,000 mcg tablet Take 1 tablet (1,000 mcg) by mouth once daily.       No current facility-administered medications on file prior to visit.       PHYSICAL EXAM    GEN: AaOx4, NAD  HEENT: normocephalic atraumatic, EOMI, MMM, pupils equal and round  PSYCH: appropriate mood and affect  RESP: nonlabored breathing   CARDIAC: Extremities WWP, RRR to  peripheral palpation  NEURO: CN 2-12 grossly intact  SKIN: Atraumatic, bilateral lower extremity eczematous reaction over her lower legs    Physical exam the right lower extremity reveals multiple small palpable nodules within the subcutaneous tissue over the anterior crest of the tibia.  They are hard and nonmobile.  They are not significantly tender to palpation.  EHL, dorsiflexion and plantarflexion are intact.  She has palpable pulses and brisk capillary refill.  Sensation is intact light touch in all distributions.    Physical exam the right upper extremity reveals a palpable mass over the subcutaneous border of the olecranon which is not freely mobile.  Slightly tender to palpation.  No evidence of trauma.  Sensation is intact light touch in all distributions.  AIN, PIN, ulnar nerves are intact and    Imaging:    XR of the right tibia and forearm, obtained and personally reviewed today, shows no obvious bony lesions or erosive changes.  She does have some stranding seen within the subcutaneous tissue of the forearm as well as tibia.      Assessment:    34-year-old female with multiple subcutaneous nodules noted on exam and history of potential inflammatory arthropathy    Plan:    I discussed with the patient that these could be multiple different etiologies and I believe advanced imaging would be warranted to see what they look like and whether or not biopsy is warranted.  I wonder could this be some sort of rheumatologic nodules given her questionable history of rheumatologic inflammatory arthropathy which she reports for multiple years ago.  She does states she is on systemic medication at one point but then ultimately came off of it because of systemic complications and sepsis.  We can order some inflammatory labs for her and also get the MRI and she will return to see us afterwards to discuss whether or not any management is required further at that time or if she should go back to see rheumatology once  again.  At this point she has not seen them in many years.

## 2024-07-11 LAB
CCP IGG SERPL-ACNC: <1 U/ML
DRVVT SCREEN TO CONFIRM RATIO: 1.04 RATIO
DRVVT/DRVVT CFM NRMLZD PPP-RTO: 1.08 RATIO
DRVVT/DRVVT CFM P DOAC NEUT NORM PPP-RTO: 0.97 RATIO
LA 2 SCREEN W REFLEX-IMP: NORMAL
NORMALIZED SCT PPP-RTO: 0.78 RATIO
SILICA CLOTTING TIME CONFIRMATION: 1.4 RATIO
SILICA CLOTTING TIME SCREEN: 1.09 RATIO

## 2024-07-16 LAB — HLAB27 TYPING: NEGATIVE

## 2024-07-24 ENCOUNTER — HOSPITAL ENCOUNTER (OUTPATIENT)
Dept: RADIOLOGY | Facility: HOSPITAL | Age: 34
Discharge: HOME | End: 2024-07-24
Payer: COMMERCIAL

## 2024-07-24 ENCOUNTER — APPOINTMENT (OUTPATIENT)
Dept: RADIOLOGY | Facility: HOSPITAL | Age: 34
End: 2024-07-24
Payer: COMMERCIAL

## 2024-07-24 DIAGNOSIS — R22.41 LEG MASS, RIGHT: ICD-10-CM

## 2024-07-24 PROCEDURE — 2500000004 HC RX 250 GENERAL PHARMACY W/ HCPCS (ALT 636 FOR OP/ED): Performed by: STUDENT IN AN ORGANIZED HEALTH CARE EDUCATION/TRAINING PROGRAM

## 2024-07-24 PROCEDURE — 73720 MRI LWR EXTREMITY W/O&W/DYE: CPT | Mod: RIGHT SIDE | Performed by: STUDENT IN AN ORGANIZED HEALTH CARE EDUCATION/TRAINING PROGRAM

## 2024-07-24 PROCEDURE — 73720 MRI LWR EXTREMITY W/O&W/DYE: CPT | Mod: RT

## 2024-07-24 PROCEDURE — A9575 INJ GADOTERATE MEGLUMI 0.1ML: HCPCS | Performed by: STUDENT IN AN ORGANIZED HEALTH CARE EDUCATION/TRAINING PROGRAM

## 2024-07-24 RX ORDER — GADOTERATE MEGLUMINE 376.9 MG/ML
15 INJECTION INTRAVENOUS
Status: COMPLETED | OUTPATIENT
Start: 2024-07-24 | End: 2024-07-24

## 2024-07-25 ENCOUNTER — APPOINTMENT (OUTPATIENT)
Dept: RADIOLOGY | Facility: HOSPITAL | Age: 34
End: 2024-07-25
Payer: COMMERCIAL

## 2024-07-27 ENCOUNTER — HOSPITAL ENCOUNTER (OUTPATIENT)
Dept: RADIOLOGY | Facility: HOSPITAL | Age: 34
Discharge: HOME | End: 2024-07-27
Payer: COMMERCIAL

## 2024-07-27 PROCEDURE — A9575 INJ GADOTERATE MEGLUMI 0.1ML: HCPCS | Performed by: STUDENT IN AN ORGANIZED HEALTH CARE EDUCATION/TRAINING PROGRAM

## 2024-07-27 PROCEDURE — 73220 MRI UPPR EXTREMITY W/O&W/DYE: CPT | Mod: RT

## 2024-07-27 PROCEDURE — 73220 MRI UPPR EXTREMITY W/O&W/DYE: CPT | Mod: RIGHT SIDE | Performed by: RADIOLOGY

## 2024-07-27 PROCEDURE — 2500000004 HC RX 250 GENERAL PHARMACY W/ HCPCS (ALT 636 FOR OP/ED): Performed by: STUDENT IN AN ORGANIZED HEALTH CARE EDUCATION/TRAINING PROGRAM

## 2024-07-27 RX ORDER — GADOTERATE MEGLUMINE 376.9 MG/ML
15 INJECTION INTRAVENOUS
Status: COMPLETED | OUTPATIENT
Start: 2024-07-27 | End: 2024-07-27

## 2024-07-27 RX ADMIN — GADOTERATE MEGLUMINE 15 ML: 376.9 INJECTION INTRAVENOUS at 09:30

## 2024-07-31 ENCOUNTER — OFFICE VISIT (OUTPATIENT)
Dept: ORTHOPEDIC SURGERY | Facility: CLINIC | Age: 34
End: 2024-07-31
Payer: COMMERCIAL

## 2024-07-31 VITALS — WEIGHT: 148 LBS | BODY MASS INDEX: 25.27 KG/M2 | HEIGHT: 64 IN

## 2024-07-31 DIAGNOSIS — R22.41 LEG MASS, RIGHT: Primary | ICD-10-CM

## 2024-07-31 PROCEDURE — 99214 OFFICE O/P EST MOD 30 MIN: CPT | Performed by: STUDENT IN AN ORGANIZED HEALTH CARE EDUCATION/TRAINING PROGRAM

## 2024-07-31 PROCEDURE — 1036F TOBACCO NON-USER: CPT | Performed by: STUDENT IN AN ORGANIZED HEALTH CARE EDUCATION/TRAINING PROGRAM

## 2024-07-31 PROCEDURE — 3008F BODY MASS INDEX DOCD: CPT | Performed by: STUDENT IN AN ORGANIZED HEALTH CARE EDUCATION/TRAINING PROGRAM

## 2024-07-31 ASSESSMENT — ENCOUNTER SYMPTOMS
DEPRESSION: 0
OCCASIONAL FEELINGS OF UNSTEADINESS: 0
LOSS OF SENSATION IN FEET: 0

## 2024-07-31 ASSESSMENT — PAIN - FUNCTIONAL ASSESSMENT: PAIN_FUNCTIONAL_ASSESSMENT: NO/DENIES PAIN

## 2024-07-31 NOTE — PROGRESS NOTES
Orthopaedic Surgery Clinic Progress Note:    CC: MRI review of right tibia and right elbow    S: 34 y.o. female with multiple previous inflammatory medical comorbidities.  She states she was diagnosed with inflammatory arthropathy once when she was younger but had complications with the medication and therefore ever went back to see rheumatology.  She experienced a growing mass on the dorsal aspect of her right forearm near the ulnar subcutaneous border which has been slowly growing over the last few month.  She is also noticed multiple small masses growing over the anterior aspect of her leg.  She has a baseline history of which she was told is eczema on her bilateral lower extremities as well.  She also suffers from fibromyalgia.  She previously saw us and we recommended getting an MRI to see if there is any nodular areas which would be amenable biopsy or excision.  She already has a referral to go see rheumatology which was placed by Dr. Guzman previously.    Objective:    Exam:  Gen: alert, appropriately conversational, no apparent distress  Chest: symmetric chest rise, non-labored breathing  Heart: RRR to peripheral palpation    Physical exam the right lower extremity reveals multiple small palpable nodules within the subcutaneous tissue over the anterior crest of the tibia.  They are hard and nonmobile.  They are not significantly tender to palpation.  EHL, dorsiflexion and plantarflexion are intact.  She has palpable pulses and brisk capillary refill.  Sensation is intact light touch in all distributions.  No change from previous exam.     Physical exam the right upper extremity reveals a palpable mass over the subcutaneous border of the olecranon which is not freely mobile.  Slightly tender to palpation.  No evidence of trauma.  Sensation is intact light touch in all distributions.  AIN, PIN, ulnar nerves are intact.  No change from previous exam.    Imaging:  XR of the right tibia and forearm, previously  obtained and personally reviewed today, shows no obvious bony lesions or erosive changes.  She does have some stranding seen within the subcutaneous tissue of the forearm as well as tibia.  MRI of the right lower extremity was reviewed by myself which demonstrates multiple areas of stranding within the subcutaneous tissue consistent with inflammatory changes but no nodular enhancing areas that would be amenable for biopsy.  Similarly, the MRI of the right elbow demonstrates some signs of a right elbow effusion which would be consistent with potential rheumatologic arthropathy in addition to some inflammatory and infiltrative changes along the subcutaneous border of the olecranon but no nodular enhancing areas again which would be amenable for biopsy or excision.    A/P:    I discussed the findings with the patient and ultimately I do believe that at this time I would not recommend surgery for these reasons.  I think that going back to rheumatology which was previously recommended to her will be my next recommendation to her as well.  I do not see anything concerning from an oncologic standpoint in the elbow or right lower extremity most of this seems to be inflammatory in nature.  We were able to provide her with another copy of the referral which was previously placed for rheumatology.  She can follow-up with us on an as-needed basis moving forward.

## 2024-08-15 ENCOUNTER — APPOINTMENT (OUTPATIENT)
Dept: PRIMARY CARE | Facility: CLINIC | Age: 34
End: 2024-08-15
Payer: COMMERCIAL

## 2024-08-19 ENCOUNTER — APPOINTMENT (OUTPATIENT)
Dept: PRIMARY CARE | Facility: CLINIC | Age: 34
End: 2024-08-19
Payer: COMMERCIAL

## 2024-09-18 ENCOUNTER — APPOINTMENT (OUTPATIENT)
Dept: PRIMARY CARE | Facility: CLINIC | Age: 34
End: 2024-09-18
Payer: COMMERCIAL

## 2024-09-23 ENCOUNTER — APPOINTMENT (OUTPATIENT)
Dept: PRIMARY CARE | Facility: CLINIC | Age: 34
End: 2024-09-23
Payer: COMMERCIAL

## 2024-09-30 ENCOUNTER — APPOINTMENT (OUTPATIENT)
Dept: PRIMARY CARE | Facility: CLINIC | Age: 34
End: 2024-09-30
Payer: COMMERCIAL

## 2024-10-07 ENCOUNTER — OFFICE VISIT (OUTPATIENT)
Dept: URGENT CARE | Age: 34
End: 2024-10-07
Payer: COMMERCIAL

## 2024-10-07 ENCOUNTER — APPOINTMENT (OUTPATIENT)
Dept: PRIMARY CARE | Facility: CLINIC | Age: 34
End: 2024-10-07
Payer: COMMERCIAL

## 2024-10-07 VITALS
HEART RATE: 93 BPM | RESPIRATION RATE: 18 BRPM | TEMPERATURE: 99.5 F | WEIGHT: 150 LBS | DIASTOLIC BLOOD PRESSURE: 82 MMHG | OXYGEN SATURATION: 99 % | BODY MASS INDEX: 25.61 KG/M2 | SYSTOLIC BLOOD PRESSURE: 129 MMHG

## 2024-10-07 DIAGNOSIS — G44.83 COUGH HEADACHE: ICD-10-CM

## 2024-10-07 DIAGNOSIS — U07.1 COVID: Primary | ICD-10-CM

## 2024-10-07 LAB — POC SARS-COV-2 AG BINAX: ABNORMAL

## 2024-10-07 ASSESSMENT — ENCOUNTER SYMPTOMS
FEVER: 1
COUGH: 1
SINUS COMPLAINT: 1

## 2024-10-07 NOTE — PROGRESS NOTES
Subjective   Patient ID: Aileen Mercado is a 34 y.o. female. They present today with a chief complaint of Sinus Problem, Nasal Congestion, and Fever (Patient here with symptoms x 3 days. Patient was on cruise and just returned about 3 days ago.).    History of Present Illness  Patient presents for congestion, sinus drainage, cough.  Mild fever.  Denies shortness of breath or chest pain.        Sinus Problem  Associated symptoms: congestion, cough and fever    Fever   Associated symptoms include congestion and coughing.       Past Medical History  Allergies as of 10/07/2024 - Reviewed 10/07/2024   Allergen Reaction Noted    Sulfadiazine Anaphylaxis 10/30/2017    Ceclor [cefaclor] Blurred vision 10/07/2024    Cephalosporins Unknown 10/30/2017    Penicillin g Dizziness 10/07/2024    Penicillins Hives and Rash 10/30/2017       (Not in a hospital admission)       Past Medical History:   Diagnosis Date    Anemia     Arthritis     Fibromyalgia     MGUS (monoclonal gammopathy of unknown significance)     Sepsis (Multi)        Past Surgical History:   Procedure Laterality Date    ANTERIOR CRUCIATE LIGAMENT REPAIR          reports that she quit smoking about 7 years ago. Her smoking use included cigarettes. She has never used smokeless tobacco. She reports that she does not currently use alcohol. She reports current drug use. Drug: Marijuana.    Review of Systems  Review of Systems   Constitutional:  Positive for fever.   HENT:  Positive for congestion.    Respiratory:  Positive for cough.    All other systems reviewed and are negative.                                 Objective    Vitals:    10/07/24 1352   BP: 129/82   BP Location: Left arm   Pulse: 93   Resp: 18   Temp: 37.5 °C (99.5 °F)   SpO2: 99%   Weight: 68 kg (150 lb)     No LMP recorded.    Physical Exam  Vitals reviewed.   Constitutional:       Appearance: Normal appearance.   HENT:      Head: Normocephalic and atraumatic.      Right Ear: Tympanic membrane, ear  canal and external ear normal.      Left Ear: Tympanic membrane, ear canal and external ear normal.      Nose: Congestion present.      Mouth/Throat:      Mouth: Mucous membranes are moist.      Pharynx: Oropharynx is clear.   Eyes:      Extraocular Movements: Extraocular movements intact.      Conjunctiva/sclera: Conjunctivae normal.      Pupils: Pupils are equal, round, and reactive to light.   Cardiovascular:      Rate and Rhythm: Normal rate and regular rhythm.      Pulses: Normal pulses.      Heart sounds: Normal heart sounds.   Pulmonary:      Effort: Pulmonary effort is normal.      Breath sounds: Normal breath sounds.   Musculoskeletal:         General: Normal range of motion.      Cervical back: Normal range of motion.   Skin:     General: Skin is warm and dry.      Capillary Refill: Capillary refill takes less than 2 seconds.   Neurological:      General: No focal deficit present.      Mental Status: She is alert and oriented to person, place, and time.   Psychiatric:         Mood and Affect: Mood normal.         Behavior: Behavior normal.         Procedures    Point of Care Test & Imaging Results from this visit  Results for orders placed or performed in visit on 10/07/24   POCT Covid-19 Rapid Antigen   Result Value Ref Range    POC ALMA-COV-2 AG Positive test for SARS-CoV-2 (antigen detected) (A) Presumptive negative test for SARS-CoV-2 (no antigen detected)      No results found.    Diagnostic study results (if any) were reviewed by MARILYN Herrera.    Assessment/Plan   Allergies, medications, history, and pertinent labs/EKGs/Imaging reviewed by MARILYN Herrera.     Medical Decision Making  Patient in NAD, VSS, HRR, lungs clear.    Reports cough and congestion.  Denies chest pain or shortness of breath.  Covid positive today.  Continue OTC pain relief, rest, keep hydrated, follow up with PCP, go to ED with any worsening symptoms, patient agrees with plan of care.       Orders and Diagnoses  Diagnoses and all orders for this visit:  COVID  Cough headache  -     POCT Covid-19 Rapid Antigen      Medical Admin Record      Patient disposition: Home    Electronically signed by MARILYN Herrera  2:24 PM

## 2024-10-10 ENCOUNTER — APPOINTMENT (OUTPATIENT)
Dept: PRIMARY CARE | Facility: CLINIC | Age: 34
End: 2024-10-10
Payer: COMMERCIAL

## 2024-10-16 ENCOUNTER — APPOINTMENT (OUTPATIENT)
Dept: PRIMARY CARE | Facility: CLINIC | Age: 34
End: 2024-10-16
Payer: COMMERCIAL

## 2024-10-17 ENCOUNTER — APPOINTMENT (OUTPATIENT)
Dept: PRIMARY CARE | Facility: CLINIC | Age: 34
End: 2024-10-17
Payer: COMMERCIAL

## 2024-10-23 ENCOUNTER — APPOINTMENT (OUTPATIENT)
Dept: PRIMARY CARE | Facility: CLINIC | Age: 34
End: 2024-10-23
Payer: COMMERCIAL

## 2024-10-24 ENCOUNTER — APPOINTMENT (OUTPATIENT)
Dept: PRIMARY CARE | Facility: CLINIC | Age: 34
End: 2024-10-24
Payer: COMMERCIAL

## 2024-11-04 ENCOUNTER — APPOINTMENT (OUTPATIENT)
Dept: PRIMARY CARE | Facility: CLINIC | Age: 34
End: 2024-11-04
Payer: COMMERCIAL

## 2024-11-13 ENCOUNTER — APPOINTMENT (OUTPATIENT)
Dept: PRIMARY CARE | Facility: CLINIC | Age: 34
End: 2024-11-13
Payer: COMMERCIAL

## 2025-01-23 ENCOUNTER — OFFICE VISIT (OUTPATIENT)
Dept: PRIMARY CARE | Facility: CLINIC | Age: 35
End: 2025-01-23
Payer: COMMERCIAL

## 2025-01-23 VITALS
HEIGHT: 64 IN | DIASTOLIC BLOOD PRESSURE: 62 MMHG | BODY MASS INDEX: 24.92 KG/M2 | HEART RATE: 80 BPM | WEIGHT: 146 LBS | SYSTOLIC BLOOD PRESSURE: 111 MMHG | OXYGEN SATURATION: 99 %

## 2025-01-23 DIAGNOSIS — Z84.81 FAMILY HISTORY OF BRCA GENE POSITIVE: Primary | ICD-10-CM

## 2025-01-23 DIAGNOSIS — I83.009: ICD-10-CM

## 2025-01-23 DIAGNOSIS — Z12.83 SKIN EXAM, SCREENING FOR CANCER: ICD-10-CM

## 2025-01-23 DIAGNOSIS — Z80.3: ICD-10-CM

## 2025-01-23 DIAGNOSIS — L97.909: ICD-10-CM

## 2025-01-23 DIAGNOSIS — Z12.31 VISIT FOR SCREENING MAMMOGRAM: ICD-10-CM

## 2025-01-23 PROCEDURE — 99214 OFFICE O/P EST MOD 30 MIN: CPT | Performed by: STUDENT IN AN ORGANIZED HEALTH CARE EDUCATION/TRAINING PROGRAM

## 2025-01-23 PROCEDURE — 3008F BODY MASS INDEX DOCD: CPT | Performed by: STUDENT IN AN ORGANIZED HEALTH CARE EDUCATION/TRAINING PROGRAM

## 2025-01-23 PROCEDURE — 1036F TOBACCO NON-USER: CPT | Performed by: STUDENT IN AN ORGANIZED HEALTH CARE EDUCATION/TRAINING PROGRAM

## 2025-01-23 NOTE — PROGRESS NOTES
"Subjective   Patient ID: Aileen Mercado is a 34 y.o. female who presents for Follow-up (Discuss breast cancer genes. ).        HPI       Older sis diagnosed with breast cancer at age 42  s/p b/l mastectomy   Tested positive for  BRCA ( her sis )   Ashkenazi Mu-ism heritage               Visit Vitals  /62   Pulse 80   Ht 1.63 m (5' 4.17\")   Wt 66.2 kg (146 lb)   LMP 2025   SpO2 99%   BMI 24.93 kg/m²   OB Status Having periods   Smoking Status Former   BSA 1.73 m²      Patient's last menstrual period was 2025.   Current Outpatient Medications   Medication Instructions    cholecalciferol (VITAMIN D-3) 1,000 Units, Daily RT    cyanocobalamin (VITAMIN B-12) 1,000 mcg, Daily RT      Social History     Tobacco Use    Smoking status: Former     Current packs/day: 0.00     Types: Cigarettes     Quit date:      Years since quittin.0    Smokeless tobacco: Never   Substance Use Topics    Alcohol use: Not Currently        Review of Systems    Constitutional : No feeling poorly / fevers/ chills / night sweats/ fatigue     Psychiatric: No anxiety/ depression/ SI/HI    All other systems have been reviewed and are negative for complaint       Physical Exam    Constitutional : Vitals reviewed. Alert and in no distress  Cardiovascular : RRR, Normal S1, S2, No pericardial rub/ gallop, no peripheral edema   Pulmonary: No respiratory distress, CTAB   MSK : Normal gait and station , strength and tone   Skin: Warm to touch ,  normal skin turgor   Neurologic : CNs 2-12 grossly intact , no obvious FNDs  Psych : A,Ox3, normal mood and affect      Assessment/Plan   Diagnoses and all orders for this visit:  Family history of BRCA gene positive  -     BI mammo bilateral screening tomosynthesis; Future  -     Referral to Genetics; Future  Visit for screening mammogram  -     BI mammo bilateral screening tomosynthesis; Future  FHx: malignant neoplasm of breast in first degree relative  -     BI mammo bilateral screening " tomosynthesis; Future  -     Referral to Genetics; Future  Varicose ulcer of lower extremity, unspecified laterality (Multi)  -     Referral to Vascular Surgery; Future  Skin exam, screening for cancer  -     Referral to Dermatology         Early screening recommended due to early diagnosis in  first degree relative  ( F, 42 ) and BRCA status   Genetics referral     Varicose veins of LE : vasc referral   Derm referral for skin concerns             Conditions addressed and mgmt as noted above.  Pertinent labs, images/ imaging reports , chart review was done .   Age appropriate labs / labs for mgmt of chronic medical conditions ordered, further mgmt pending the results.       This note is intended for the physician writing it, as well as to communicate findings to other healthcare professionals. These notes use medical lexicon that may be misunderstood by non medical persons. Therefore, interpretations of medical notes and terminology should be approached with caution.

## 2025-02-14 ENCOUNTER — APPOINTMENT (OUTPATIENT)
Dept: PRIMARY CARE | Facility: CLINIC | Age: 35
End: 2025-02-14
Payer: COMMERCIAL

## 2025-04-08 ENCOUNTER — APPOINTMENT (OUTPATIENT)
Dept: GENETICS | Facility: CLINIC | Age: 35
End: 2025-04-08
Payer: COMMERCIAL

## 2025-04-08 DIAGNOSIS — Z84.81 FAMILY HISTORY OF BRCA GENE POSITIVE: ICD-10-CM

## 2025-04-08 DIAGNOSIS — Z80.3: ICD-10-CM

## 2025-04-08 PROCEDURE — 96041 GENETIC COUNSELING SVC EA 30: CPT | Performed by: GENETIC COUNSELOR, MS

## 2025-04-08 NOTE — PROGRESS NOTES
Subjective   Patient ID: Aileen Mercado is a 34 y.o. female who was referred to cancer genetics clinic by Petra Garcia, *due to a family history of breast and pancreatic cancer, and a known pathogenic BRCA2 mutation. We reviewed BRCA2 related cancer, and the genetic testing options available to Aileen.     CANCER MEDICAL HISTORY   Personal history of cancer?   -no     PREVIOUS GENETIC TESTING?   -no     CANCER SCREENING HISTORY   Mammogram/MRI?   -heterogeneously dense   -: probably benign mass in the right breast at 7 o'clock, follow-up in 6 months     Breast Biopsy?   -no     Ovarian cancer screening (ultrasound or CA-125)?   -no     PAP smear?   -within 6 years, needed a biopsy, WNL   -: WNL     Colonoscopy?   -no     Endoscopy?   -no     Hysterectomy?   -no     Oophorectomy?   -no     Dermatology?   -no     Other cancer screening?   -no     Radiation?   -no     OTHER MEDICAL CONCERNS   -GERD   -anxiety with depression   -fibromyalgia     REPRODUCTIVE HISTORY   # pregnancies: 0   Menarche: 11   Menopause: no   OCP: no   HRT: no     SOCIAL HISTORY   Smoking: former cigarette smoker, 8 years ago   Alcohol use: rarely   Career: [employer]     FAMILY HISTORY:   A 4-generation pedigree was obtained. The family history was significant for the following:     -Sister (41) recently diagnosed with breast cancer, has BRCA2 c.5946del(p.[id number]*22) mutation.   -Brother (38) with BRCA2 c.5946del(p.[id number]*22) mutation.   -Mother (70) with multiple non-melanoma skin cancers.   -Maternal grandmother  of pancreatic cancer in her 80s.     Consanguinity was denied. The patient is of Ashkenazi Orthodox/ descent. The remainder of the family history was negative for intellectual disability, birth defects, miscarriages, stillbirths, blindness, deafness, kidney disease, heart disease, cancer, muscle disease, and blood disorders.     Assessment/Plan   Genetic Test Ordered: yes    Aileen Mercado is a  34 y.o. woman with a family history of breast cancer and a pathogenic BRCA2  mutation in her sister, and pancreatic cancer in her maternal grandmother. Aileen meets NCCN criteria for testing of the BRCA1 and BRCA2 genes based on her family history of a known BRCA2  mutation. While the pathogenic BRCA2 mutation likely explains the extended family history of pancreatic cancer, not everyone who had cancer had genetic testing. It is possible that there is more than one mutation/pathogenic variant in the family which could explain the family history. We discussed performing single-site testing for the familial BRCA2  mutation. We also discussed the option of a larger hereditary cancer panel, such as the 76-gene CancerNext Expanded panel (given the reasoning stated above).      We reviewed genes and inherited forms of cancer in respect to the BRCA2  gene. We discussed that most cancers are not due to an inherited genetic susceptibility. However, in about 5-10% of families, such as yours, there is an inherited genetic mutation that can make a person more susceptible to developing certain forms of cancer. Within these families, we often see multiple family members with cancer, occurring in multiple generations. In addition, earlier onset and bilateral cancers are suggestive of an inherited form of cancer. There also tends to be a clustering of certain types of cancer in these families, such as breast and ovarian cancer. This could be BRCA1 or BRCA2-related hereditary breast and ovarian cancer (HBOC), or hereditary breast cancer due to a different gene mutation, such as DILIP.     Cancer risks associated with a BRCA2 mutation are as follows:  Up to 69% risk for a first breast cancer by age 70 (though historically some estimates have been up to 87%). The general population risk to develop breast cancer is 10-12%.  Mutation carriers (females) who have already been diagnosed with breast cancer have up to a 25% chance to develop  a contralateral breast cancer 20 years after their primary breast cancer diagnosis.   Up to a 29% risk to develop ovarian cancer. The general population risk to develop ovarian cancer is ~1.3%.  Up to a 10% risk for ovarian cancer. The general population risk ~2%.  Increased risk for melanoma above the general population. The general population risk is ~2%.  Up to a 7.1% risk for male breast cancer. The general population risk is <1%.  Up to a 61% risk of prostate cancer. The general population risk is 12%).    Most of the genes associated with cancer predisposition syndromes, such as BRCA1 and BRCA2, are inherited in a dominant fashion. Dominant inheritance means that inheriting one copy of an altered BRCA1 or BRCA2 gene significantly increases the risk for the development of cancer, and is therefore referred to as a dominant cancer susceptibility gene.     Aileen has a 50% chance of carrying the BRCA2 mutation that was identified in her sister. If Aileen  tests positive for the familial BRCA2 mutation.     Aileen  was counseled about hereditary cancer susceptibility including cancer risks, options for increased screening and/or risk reduction, genetic testing, and the implications for other family members.     Aileen's maternal grandmother never had genetic testing, therefore, it is possible that there is more than one mutation/pathogenic variant in the family which could explain the family history of pancreatic cancer. We discussed performing single-site testing for the familial BRCA2 mutation.  We also discussed the option of performing genetic testing in the context of a multi-gene panel test that looks at the BRCA1 and BRCA2 genes, as well as other genes associated with cancer risk (given the reasoning stated above). Aileen is interested in this approach, so genetic testing will be ordered via the 76-gene CancerNext Expanded panel from [facility].    We discussed the type of results that can be obtained  "with this testing:  The first is a positive result.  The next is a negative result.  The last result is a \"maybe\" which we call a variant of unknown significant, meaning that we found a change, but are not sure whether that change increases the risk for cancer or not.    Additionally, we discussed the Genetic Information Nondiscrimination Act (ADRIANNA). ADRIANNA prohibits discrimination by health insurance plans and employers based on one's genetic information. ADRIANNA does not apply to life, long-term care or disability insurance. These insurers are allowed to use genetic, personal or family health information to make coverage or premium decisions.  Some states have their own genetic protection laws, providing additional security against genetic discrimination for these types of insurance. In addition, ADRIANNA does not apply to members of the [place] , veterans obtaining health care through the [employer], individuals using the  Health Service, or federal employees enrolled in the Federal Employees Health Benefits program (FEHB).    PLAN:  Aileen elected to undergo genetic testing via [facility]'s 76-gene-gene CancerNext Expanded panel.   Consent for testing was obtained,  A buccal collection kit will be sent to Aileen's home, and she will [facility] it back to [facility].  Aileen will be contacted via telemedicine on 5/6/25 at 9:30 to discuss her testing results.  At that time, we will make recommendations for both Aileen and her family members in terms of cancer screening and/or cancer risk reduction options.  Please call [phone] with any questions.    A virtual (video and audio) visit between the patient (at the originating site) and provider (at the distant site) was utilized to provide this telehealth service. Verbal consent was requested and obtained from Aileen Mercado on this date, April 8, 2025, for a telehealth visit.    Total time spent on day of encounter: 45 minutes (36 minutes with " patient, 9 minutes on pre/post patient care activities, including documentation).    Kathy Person MS [facility] [place], [place]  Licensed Genetic Counselor  [place] [facility]  [phone]